# Patient Record
Sex: MALE | Race: WHITE | NOT HISPANIC OR LATINO | URBAN - METROPOLITAN AREA
[De-identification: names, ages, dates, MRNs, and addresses within clinical notes are randomized per-mention and may not be internally consistent; named-entity substitution may affect disease eponyms.]

---

## 2023-06-08 ENCOUNTER — TELEPHONE (OUTPATIENT)
Dept: PRIMARY CARE | Facility: CLINIC | Age: 68
End: 2023-06-08

## 2024-09-28 ENCOUNTER — OFFICE VISIT (OUTPATIENT)
Dept: URGENT CARE | Age: 69
End: 2024-09-28
Payer: MEDICARE

## 2024-09-28 VITALS
TEMPERATURE: 98.2 F | RESPIRATION RATE: 18 BRPM | OXYGEN SATURATION: 96 % | DIASTOLIC BLOOD PRESSURE: 79 MMHG | SYSTOLIC BLOOD PRESSURE: 157 MMHG | HEART RATE: 68 BPM | WEIGHT: 236 LBS | BODY MASS INDEX: 31.14 KG/M2

## 2024-09-28 DIAGNOSIS — R68.83 CHILLS: Primary | ICD-10-CM

## 2024-09-28 DIAGNOSIS — U07.1 COVID-19: ICD-10-CM

## 2024-09-28 LAB — POC SARS-COV-2 AG BINAX: ABNORMAL

## 2024-09-28 RX ORDER — CALCIUM CARBONATE/VITAMIN D3 500-10/5ML
LIQUID (ML) ORAL
COMMUNITY

## 2024-09-28 RX ORDER — ACETAMINOPHEN 500 MG
5000 TABLET ORAL
COMMUNITY

## 2024-09-28 RX ORDER — ASCORBIC ACID 125 MG
TABLET,CHEWABLE ORAL
COMMUNITY

## 2024-09-28 RX ORDER — COLCHICINE 0.6 MG/1
0.6 TABLET ORAL
COMMUNITY
Start: 2024-03-04 | End: 2025-03-04

## 2024-09-28 RX ORDER — NITROGLYCERIN 0.4 MG/1
0.4 TABLET SUBLINGUAL
COMMUNITY
Start: 2024-03-04 | End: 2025-03-04

## 2024-09-28 RX ORDER — TAMSULOSIN HYDROCHLORIDE 0.4 MG/1
0.4 CAPSULE ORAL
COMMUNITY

## 2024-09-28 RX ORDER — HYDROCHLOROTHIAZIDE 25 MG/1
1 TABLET ORAL
COMMUNITY
Start: 2024-03-04

## 2024-09-28 RX ORDER — ASPIRIN 81 MG/1
81 TABLET ORAL
COMMUNITY
Start: 2023-03-13

## 2024-09-28 RX ORDER — METHYLPREDNISOLONE 4 MG/1
TABLET ORAL
Qty: 21 TABLET | Refills: 0 | Status: SHIPPED | OUTPATIENT
Start: 2024-09-28 | End: 2024-10-05

## 2024-09-28 RX ORDER — GLUCOSAMINE SULFATE 500 MG
TABLET ORAL
COMMUNITY

## 2024-09-28 RX ORDER — ATORVASTATIN CALCIUM 10 MG/1
1 TABLET, FILM COATED ORAL DAILY
COMMUNITY
Start: 2022-05-19

## 2024-09-28 RX ORDER — ROSUVASTATIN CALCIUM 20 MG/1
20 TABLET, COATED ORAL
COMMUNITY
Start: 2024-09-04

## 2024-09-28 RX ORDER — EZETIMIBE 10 MG/1
10 TABLET ORAL DAILY
COMMUNITY

## 2024-09-28 RX ORDER — AMLODIPINE BESYLATE 10 MG/1
10 TABLET ORAL DAILY
COMMUNITY

## 2024-09-28 ASSESSMENT — ENCOUNTER SYMPTOMS
ALLERGIC/IMMUNOLOGIC NEGATIVE: 1
CHILLS: 1
GASTROINTESTINAL NEGATIVE: 1
CARDIOVASCULAR NEGATIVE: 1
RESPIRATORY NEGATIVE: 1
MUSCULOSKELETAL NEGATIVE: 1
EYES NEGATIVE: 1
PSYCHIATRIC NEGATIVE: 1
ENDOCRINE NEGATIVE: 1
NEUROLOGICAL NEGATIVE: 1
HEMATOLOGIC/LYMPHATIC NEGATIVE: 1

## 2024-09-28 NOTE — PROGRESS NOTES
Subjective   Patient ID: Jose Antonio Emanuel is a 69 y.o. male. They present today with a chief complaint of covid.    History of Present Illness  HPI    Past Medical History  Allergies as of 09/28/2024 - Reviewed 09/28/2024   Allergen Reaction Noted    Lisinopril Unknown 08/09/2023    Losartan Unknown 02/08/2023       (Not in a hospital admission)       Past Medical History:   Diagnosis Date    Personal history of malignant neoplasm of prostate 06/19/2019    History of malignant neoplasm of prostate    Personal history of other diseases of the circulatory system     History of hypertension    Personal history of other diseases of the circulatory system 06/19/2019    History of atrial fibrillation less than 8 weeks after coronary artery bypass graft    Personal history of other endocrine, nutritional and metabolic disease 02/09/2019    History of hyperglycemia       Past Surgical History:   Procedure Laterality Date    OTHER SURGICAL HISTORY  06/19/2019    Achilles tenotomy            Review of Systems  Review of Systems   Constitutional:  Positive for chills.   HENT: Negative.     Eyes: Negative.    Respiratory: Negative.     Cardiovascular: Negative.    Gastrointestinal: Negative.    Endocrine: Negative.    Genitourinary: Negative.    Musculoskeletal: Negative.    Allergic/Immunologic: Negative.    Neurological: Negative.    Hematological: Negative.    Psychiatric/Behavioral: Negative.                                    Objective    Vitals:    09/28/24 1933   BP: 157/79   Pulse: 68   Resp: 18   Temp: 36.8 °C (98.2 °F)   SpO2: 96%   Weight: 107 kg (236 lb)     No LMP for male patient.    Physical Exam  Constitutional:       Appearance: Normal appearance.   HENT:      Head: Normocephalic.      Nose: Nose normal.      Mouth/Throat:      Mouth: Mucous membranes are moist.      Pharynx: Oropharynx is clear.   Eyes:      Extraocular Movements: Extraocular movements intact.      Pupils: Pupils are equal, round, and  reactive to light.   Pulmonary:      Effort: Pulmonary effort is normal.   Musculoskeletal:         General: Normal range of motion.      Cervical back: Normal range of motion and neck supple.   Neurological:      General: No focal deficit present.      Mental Status: He is alert and oriented to person, place, and time.   Psychiatric:         Mood and Affect: Mood normal.         Behavior: Behavior normal.         Procedures    Point of Care Test & Imaging Results from this visit  Results for orders placed or performed in visit on 09/28/24   POCT Covid-19 Rapid Antigen   Result Value Ref Range    POC ASIF-COV-2 AG Positive test for SARS-CoV-2 (antigen detected) (A) Presumptive negative test for SARS-CoV-2 (no antigen detected)      No results found.    Diagnostic study results (if any) were reviewed by DENISSE Guerra.    Assessment/Plan   Allergies, medications, history, and pertinent labs/EKGs/Imaging reviewed by DENISSE Guerra.     Medical Decision Making    Rapid COVID- POSITIVE     Adhere to CDC guidelines and stay home for at least 2-5 days and isolate from others in your home.   You are likely most infectious during these first 5 days, but new guidelines state if you are fever free w/o fever reducing medications you can go out in public with a tight fitting mask for at least 5 days   Wear a high-quality mask if you must be around others at home and in public.   Do not go places where you are unable to wear a mask. For travel guidance, see CDC’s Travel webpage.   Do not travel.   Stay home and separate from others as much as possible.   Use a separate bathroom, if possible.   Take steps to improve ventilation at home, if possible.   Don’t share personal household items, like cups, towels, and utensils.   Monitor your symptoms. If you have an emergency warning sign (like trouble breathing), seek emergency medical care immediately.     Learn more about what to do if you have COVID-19.   You  do not need to retest after testing positive. Instead, you may end your isolation after five days if you feel better and are fever-free for 24 hours without using a fever-reducing medicine. You are most infectious during those five days, but the CDC advises wearing a high-quality mask through day 10.5   Still, if you decide to retest, a rapid antigen test will likely yield the most accurate results for ending your isolation.   You may need to test again within three months of a positive COVID-19 test, such as for travel. The CDC advises using a rapid antigen test instead of a PCR test if it's been less than three months since you had COVID-19.2        Managing COVID-19 symptoms        While recovering at home, keep track of your symptoms. Follow your provider's instructions and take medicines as prescribed. If you have severe symptoms, call 911 or the local emergency number.     To help manage symptoms of COVID-19, try the following tips.     Rest and drink plenty of fluids.     Acetaminophen (Tylenol) and ibuprofen (Advil, Motrin) help reduce fever. Sometimes, providers advise you to use both types of medicine. Take the recommended amount to reduce fever. DO NOT use ibuprofen in children 6 months or younger.     A lukewarm bath or sponge bath may help cool a fever. Keep taking medicine -- otherwise your temperature might go back up.     For a sore throat, gargle several times a day with warm salt water (1/2 tsp or 3 grams of salt in 1 cup or 240 milliliters of water). Drink warm liquids such as tea, or lemon tea with honey. Suck on hard candies or throat lozenges.     Use a vaporizer or take a steamy shower to increase moisture in the air, reduce nasal congestion, and help soothe a dry throat and cough.     Saline spray can also help reduce nasal congestion.     To help relieve diarrhea, drink 8 to 10 glasses of clear liquids, such as water, diluted fruit juices, and clear soups to make up for fluid loss. Avoid  dairy products, fried foods, caffeine, alcohol, and carbonated drinks.     If you have nausea, eat small meals with bland foods. Avoid foods with strong smells. Try to drink 8 to 10 glasses of water or clear fluids every day to stay hydrated.     Do not smoke, and stay away from secondhand smoke.      HOME CARE INSTRUCTIONS:     --- Drink plenty of water. Water helps thin the mucus so your sinuses can drain more easily.     --- Use a humidifier.     --- Inhale steam 3 to 4 times a day (for example, sit in the bathroom with the shower running).     --- Apply a warm, moist washcloth to your face 3 to 4 times a day, or as directed by your caregiver.     --- Take over-the-counter or prescription medicines for pain, discomfort, or fever only as directed by your caregiver.     SEEK FURTHER TREATMENT IF YOU:     --- You have increasing pain or severe headaches.     --- You have nausea, vomiting, or drowsiness.     --- You have swelling around your face.     --- You have vision problems.     --- You have a stiff neck.     --- You have difficulty breathing.       Orders and Diagnoses  Diagnoses and all orders for this visit:  Chills  -     POCT Covid-19 Rapid Antigen  COVID-19      Medical Admin Record      Patient disposition: Home    Electronically signed by DENISSE Guerra  7:45 PM

## 2024-12-29 ENCOUNTER — OFFICE VISIT (OUTPATIENT)
Dept: URGENT CARE | Age: 69
End: 2024-12-29
Payer: MEDICARE

## 2024-12-29 VITALS
BODY MASS INDEX: 30.46 KG/M2 | DIASTOLIC BLOOD PRESSURE: 65 MMHG | HEART RATE: 68 BPM | SYSTOLIC BLOOD PRESSURE: 142 MMHG | WEIGHT: 245 LBS | OXYGEN SATURATION: 97 % | TEMPERATURE: 99 F | RESPIRATION RATE: 14 BRPM | HEIGHT: 75 IN

## 2024-12-29 DIAGNOSIS — R68.89 FLU-LIKE SYMPTOMS: ICD-10-CM

## 2024-12-29 LAB
POC RAPID INFLUENZA A: NEGATIVE
POC RAPID INFLUENZA B: NEGATIVE
POC SARS-COV-2 AG BINAX: NORMAL

## 2024-12-29 PROCEDURE — 1159F MED LIST DOCD IN RCRD: CPT | Performed by: FAMILY MEDICINE

## 2024-12-29 PROCEDURE — 99213 OFFICE O/P EST LOW 20 MIN: CPT | Performed by: FAMILY MEDICINE

## 2024-12-29 PROCEDURE — 1036F TOBACCO NON-USER: CPT | Performed by: FAMILY MEDICINE

## 2024-12-29 PROCEDURE — 87811 SARS-COV-2 COVID19 W/OPTIC: CPT | Performed by: FAMILY MEDICINE

## 2024-12-29 PROCEDURE — 3008F BODY MASS INDEX DOCD: CPT | Performed by: FAMILY MEDICINE

## 2024-12-29 PROCEDURE — 87804 INFLUENZA ASSAY W/OPTIC: CPT | Performed by: FAMILY MEDICINE

## 2024-12-29 NOTE — PROGRESS NOTES
Subjective   Patient ID: Jose Antonio Emanuel is a 69 y.o. male. They present today with a chief complaint of URI (X last night cough, post nasal drip, chills, runny nose, headache due to cough. Has tried Tylenol at 1600 with mild relief.).    History of Present Illness  HPI  1 days of cough, congestion, headache, postnasal drip runny nose. No fevers have been noted. Patient denies shortness of breath, chest pain, or wheezing. No red eyes, eye pain, or discharge. They deny nausea vomiting or diarrhea. No known exposures to strep mono influenza, COVID-19 or pneumonia. No skin rashes are noted. Over-the-counter medications are offering minimal relief from symptoms.      Past Medical History  Allergies as of 12/29/2024 - Reviewed 12/29/2024   Allergen Reaction Noted    Lisinopril Unknown 08/09/2023    Losartan Unknown 02/08/2023       (Not in a hospital admission)       Past Medical History:   Diagnosis Date    Personal history of malignant neoplasm of prostate 06/19/2019    History of malignant neoplasm of prostate    Personal history of other diseases of the circulatory system     History of hypertension    Personal history of other diseases of the circulatory system 06/19/2019    History of atrial fibrillation less than 8 weeks after coronary artery bypass graft    Personal history of other endocrine, nutritional and metabolic disease 02/09/2019    History of hyperglycemia       Past Surgical History:   Procedure Laterality Date    OTHER SURGICAL HISTORY  06/19/2019    Achilles tenotomy        reports that he has never smoked. He has never been exposed to tobacco smoke. He has never used smokeless tobacco.    Review of Systems  Review of Systems     As in history of present                          Objective    Vitals:    12/29/24 1819   BP: 142/65   BP Location: Right arm   Patient Position: Sitting   BP Cuff Size: Large adult   Pulse: 68   Resp: 14   Temp: 37.2 °C (99 °F)   TempSrc: Oral   SpO2: 97%   Weight: 111 kg  "(245 lb)   Height: 1.905 m (6' 3\")     No LMP for male patient.    Physical Exam  Gen.-alert cooperative and in no apparent distress  Head and face- no swelling redness, tenderness or rash  Eyes-EOMI, no redness or discharge is noted  ENT- bilateral nasal congestion with clear rhinorrhea and postnasal drip in oral pharynx  Neck- normal range of motion with no lymphadenopathy or mass.   Pulmonary- no respiratory distress noted. Lungs clear to auscultation without wheezes rhonchi or rales  Skin- no rashes discoloration or skin lesions noted  Lymphatic-- no lymph node swelling or tenderness appreciated  Procedures    Point of Care Test & Imaging Results from this visit  Results for orders placed or performed in visit on 12/29/24   POCT Covid-19 Rapid Antigen   Result Value Ref Range    POC ASIF-COV-2 AG  Presumptive negative test for SARS-CoV-2 (no antigen detected)     Presumptive negative test for SARS-CoV-2 (no antigen detected)   POCT Influenza A/B manually resulted   Result Value Ref Range    POC Rapid Influenza A Negative Negative    POC Rapid Influenza B Negative Negative      No results found.    Diagnostic study results (if any) were reviewed by Nestor Rivers MD.    Assessment/Plan   Allergies, medications, history, and pertinent labs/EKGs/Imaging reviewed by Nestor Rivers MD.     Medical Decision Making  At time of discharge patient was clinically well-appearing and HDS for outpatient management. The patient and/or family was educated regarding diagnosis, supportive care, OTC and Rx medications. The patient and/or family was given the opportunity to ask questions prior to discharge.  They verbalized understanding of my discussion of the plans for treatment, expected course, indications to return to  or seek further evaluation in ED, and the need for timely follow up as directed.   They were provided with a work/school excuse if requested.    Orders and Diagnoses  Diagnoses and all orders for this " visit:  Flu-like symptoms  -     POCT Covid-19 Rapid Antigen  -     POCT Influenza A/B manually resulted      Medical Admin Record      Patient disposition: Home    Electronically signed by Nestor Rivers MD  6:39 PM